# Patient Record
Sex: MALE
[De-identification: names, ages, dates, MRNs, and addresses within clinical notes are randomized per-mention and may not be internally consistent; named-entity substitution may affect disease eponyms.]

---

## 2021-02-25 PROBLEM — Z00.00 ENCOUNTER FOR PREVENTIVE HEALTH EXAMINATION: Status: ACTIVE | Noted: 2021-02-25

## 2021-03-02 PROBLEM — C61 ADENOCARCINOMA OF PROSTATE: Status: ACTIVE | Noted: 2021-03-02

## 2021-03-02 RX ORDER — VENLAFAXINE HYDROCHLORIDE 75 MG/1
75 CAPSULE, EXTENDED RELEASE ORAL
Refills: 0 | Status: ACTIVE | COMMUNITY

## 2021-03-02 RX ORDER — LORAZEPAM 1 MG/1
1 TABLET ORAL
Refills: 0 | Status: ACTIVE | COMMUNITY

## 2021-03-02 RX ORDER — ROSUVASTATIN CALCIUM 5 MG/1
TABLET, FILM COATED ORAL
Refills: 0 | Status: ACTIVE | COMMUNITY

## 2021-03-02 RX ORDER — MONTELUKAST 10 MG/1
10 TABLET, FILM COATED ORAL
Refills: 0 | Status: ACTIVE | COMMUNITY

## 2021-03-02 RX ORDER — RAMIPRIL 5 MG/1
5 CAPSULE ORAL
Refills: 0 | Status: ACTIVE | COMMUNITY

## 2021-03-03 ENCOUNTER — APPOINTMENT (OUTPATIENT)
Dept: RADIATION ONCOLOGY | Facility: CLINIC | Age: 76
End: 2021-03-03
Payer: MEDICARE

## 2021-03-03 DIAGNOSIS — C61 MALIGNANT NEOPLASM OF PROSTATE: ICD-10-CM

## 2021-03-03 PROCEDURE — 99442: CPT | Mod: 95

## 2021-03-03 NOTE — HISTORY OF PRESENT ILLNESS
[FreeTextEntry1] : Mr. Jay Knight . 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 On 3/18/2020, he completed a course of definitive stereotactic body radiation therapy to the prostate using Cyberknife technology for a total of 3625 cGy over 5 fractions.  He declined ADT as a component of his treatment.  He was treated with neoadjuvant and concurrent Finasteride. He tolerated his radiation well with no untoward side effects and no interruption in treatment. \par \par 3/3/21- FOLLOW UP\par He notes that he is feeling well, with minimal symptomatology related to his definitive radiation therapy.  Specifically, he notes nocturia x4; of note, he had stopped taking Finasteride in August.  He notes some post-void dribbling and incomplete emptying.  He had seen Dr. Hilliard, who recommended he start Flomax 0.4mg at night, which he has not done yet.  He denies dysuria or incontinence.  He denies blood in the urine.  He has no blood or mucous in the stool, and denies rectal pain.  He has poor baseline erections.  He notes he had a benign exam with Dr. Hilliard.  His PSA was 1.06 ng/mL on 5/14/2020, and it was 0.94 ng/mL on 1/9/21 (off Finasteride).\par

## 2021-03-03 NOTE — DISEASE MANAGEMENT
[X] : TX [0-10] : 0 -10 ng/mL [7(3+4)] : Reilly Score 7(3+4) [EBRT] : EBRT [IIB] : IIB [RadiationCompletedDate] : 3/18/2020 [EBRTDose] : 3625 cGy [EBRTFractions] : 5